# Patient Record
Sex: FEMALE | URBAN - METROPOLITAN AREA
[De-identification: names, ages, dates, MRNs, and addresses within clinical notes are randomized per-mention and may not be internally consistent; named-entity substitution may affect disease eponyms.]

---

## 2021-03-29 ENCOUNTER — APPOINTMENT (RX ONLY)
Dept: URBAN - METROPOLITAN AREA CLINIC 55 | Facility: CLINIC | Age: 56
Setting detail: DERMATOLOGY
End: 2021-03-29

## 2021-03-29 DIAGNOSIS — Z41.9 ENCOUNTER FOR PROCEDURE FOR PURPOSES OTHER THAN REMEDYING HEALTH STATE, UNSPECIFIED: ICD-10-CM

## 2021-03-29 PROCEDURE — ? COSMETIC CONSULTATION: GENERAL

## 2021-03-29 PROCEDURE — ? SUBCUTANEOUS HORMONE PELLET IMPLANTATION

## 2021-03-29 PROCEDURE — ? BOTOX

## 2021-03-29 NOTE — PROCEDURE: SUBCUTANEOUS HORMONE PELLET IMPLANTATION
Anesthesia Volume In Cc: 8
Anesthesia Type: 1% lidocaine with epinephrine and a 1:10 solution of 8.4% sodium bicarbonate
Anesthesia Text (1% Lidocaine With Epinephrine (1.5cc)......): was injected subdermally 2 inches in a horizontal fashion to achieve local anesthesia.
Camacho Lot Number: No
Pre-Operative Text: The patient was placed in a lateral recumbent position. The skin 2-3 inches below the waistline in the upper outer quadrant of the buttocks was cleansed with alcohol. See diagram.
Incision And Trocar Text: After ensuring adequate anesthesia, a 4mm stab incision was made. A 3.5mm trocar was introduced through the incision into the subcutaneous fat coursing horizontally toward the hip. The stylus was then removed.
Testosterone Expiration Date (Optional): 07/22/21, 03/04/21
Number Of Testosterone Pellets: 1
Detail Level: None
Price (Use Numbers Only, No Special Characters Or $): 719
Estradiol Expiration Date (Optional): 10/10/2021
Testosterone Lot Number (Optional): [id number] 87.5mg, [id number]
Pellet Placement Text (Pellets Of Testosterone.....Xxx): and or estradiol were then placed into the trocar and slid into place under the skin, approximately 1.5 inches from the incision without difficulty. The trocar was then withdrawn and the wound was closed with steri-strips and a dressing was applied. The patient applied pressure to the wound for 4 minutes. There was less than 1cc of blood loss during the case. The patient was given a post-op instruction sheet and has been instructed to call us if she experiences any problems.
Estradiol Lot Number (Optional): [id number]
Total Testosterone Mg (Optional): 125
Total Estradiol Mg (Optional): 10
Post-Care Instructions: I reviewed with the patient in detail post-care instructions. Patient understands to call the clinic if there is any redness, swelling or pain. A detailed post-insertion hand was reviewed and provided to the patient.

## 2021-03-29 NOTE — PROCEDURE: BOTOX
Detail Level: Detailed
Additional Area 6 Units: 0
Show Orbicularis Oculi Units: Yes
Price (Use Numbers Only, No Special Characters Or $): 737
Show Right And Left Brow Units: No
Post-Care Instructions: Patient instructed to not lie down for 4 hours post procedure and informed not to manipulate treatment area for 4 hours. \\nRecommended follow up in 7 days if needed.
Additional Area 2 Location: chin
Glabellar Complex Units: 20
Expiration Date (Month Year): 12/2023
Dilution (U/0.1 Cc): 4
Lot #: [id number]
Consent: Written consent obtained. Patient denies pregnancy and breastfeeding. Risks include but not limited to lid/brow ptosis, bruising, swelling, diplopia, temporary effect, incomplete chemical denervation.

## 2021-05-19 ENCOUNTER — APPOINTMENT (RX ONLY)
Dept: URBAN - METROPOLITAN AREA CLINIC 55 | Facility: CLINIC | Age: 56
Setting detail: DERMATOLOGY
End: 2021-05-19

## 2021-05-19 DIAGNOSIS — Z41.9 ENCOUNTER FOR PROCEDURE FOR PURPOSES OTHER THAN REMEDYING HEALTH STATE, UNSPECIFIED: ICD-10-CM

## 2021-05-19 PROCEDURE — ? LAB REPORTS REVIEWED

## 2021-05-19 NOTE — PROCEDURE: LAB REPORTS REVIEWED
Detail Level: Zone
Summarized Lab Results: No change in BioTe treatment plan. Estradiol 79, testosterone 216, FSH 23.1, TSH 1.12

## 2021-10-04 ENCOUNTER — APPOINTMENT (RX ONLY)
Dept: URBAN - METROPOLITAN AREA CLINIC 55 | Facility: CLINIC | Age: 56
Setting detail: DERMATOLOGY
End: 2021-10-04

## 2021-10-04 DIAGNOSIS — Z41.9 ENCOUNTER FOR PROCEDURE FOR PURPOSES OTHER THAN REMEDYING HEALTH STATE, UNSPECIFIED: ICD-10-CM

## 2021-10-04 PROCEDURE — ? SUBCUTANEOUS HORMONE PELLET IMPLANTATION

## 2021-10-04 ASSESSMENT — LOCATION ZONE DERM: LOCATION ZONE: TRUNK

## 2021-10-04 ASSESSMENT — LOCATION SIMPLE DESCRIPTION DERM: LOCATION SIMPLE: LEFT BUTTOCK

## 2021-10-04 ASSESSMENT — LOCATION DETAILED DESCRIPTION DERM: LOCATION DETAILED: LEFT BUTTOCK

## 2021-10-04 NOTE — PROCEDURE: SUBCUTANEOUS HORMONE PELLET IMPLANTATION
Incision And Trocar Text: After ensuring adequate anesthesia, a 4mm stab incision was made. A 3.5mm trocar was introduced through the incision into the subcutaneous fat coursing horizontally toward the hip. The stylus was then removed.
Price (Use Numbers Only, No Special Characters Or $): 960
Pellet Placement Text (Pellets Of Testosterone.....Xxx): and or estradiol were then placed into the trocar and slid into place under the skin, approximately 1.5 inches from the incision without difficulty. The trocar was then withdrawn and the wound was closed with steri-strips and a dressing was applied. The patient applied pressure to the wound for 4 minutes. There was less than 1cc of blood loss during the case. The patient was given a post-op instruction sheet and has been instructed to call us if she experiences any problems.
Camacho Lot Number: No
Number Of Estradiol Pellets: 1
Estradiol Lot Number (Optional): [id number]
Detail Level: None
Number Of Testosterone Pellets: 2
Pre-Operative Text: The patient was placed in a lateral recumbent position. The skin 2-3 inches below the waistline in the upper outer quadrant of the buttocks was cleansed with alcohol. See diagram.
Anesthesia Volume In Cc: 8
Total Testosterone Mg (Optional): 112.5
Post-Care Instructions: I reviewed with the patient in detail post-care instructions. Patient understands to call the clinic if there is any redness, swelling or pain. A detailed post-insertion hand was reviewed and provided to the patient.
Testosterone Expiration Date (Optional): 6/3/2022, 7/3/2022
Testosterone Lot Number (Optional): [id number]- 87.5mg, [id number] 25mg
Total Estradiol Mg (Optional): 10
Estradiol Expiration Date (Optional): 5/19/2022
Anesthesia Text (1% Lidocaine With Epinephrine (1.5cc)......): was injected subdermally 2 inches in a horizontal fashion to achieve local anesthesia.
Anesthesia Type: 1% lidocaine with epinephrine and a 1:10 solution of 8.4% sodium bicarbonate

## 2022-03-15 ENCOUNTER — APPOINTMENT (RX ONLY)
Dept: URBAN - METROPOLITAN AREA CLINIC 55 | Facility: CLINIC | Age: 57
Setting detail: DERMATOLOGY
End: 2022-03-15

## 2022-03-15 DIAGNOSIS — Z41.9 ENCOUNTER FOR PROCEDURE FOR PURPOSES OTHER THAN REMEDYING HEALTH STATE, UNSPECIFIED: ICD-10-CM

## 2022-03-15 PROCEDURE — ? BOTOX

## 2022-03-15 PROCEDURE — ? SUBCUTANEOUS HORMONE PELLET IMPLANTATION

## 2022-03-15 ASSESSMENT — LOCATION DETAILED DESCRIPTION DERM: LOCATION DETAILED: RIGHT BUTTOCK

## 2022-03-15 ASSESSMENT — LOCATION ZONE DERM: LOCATION ZONE: TRUNK

## 2022-03-15 ASSESSMENT — LOCATION SIMPLE DESCRIPTION DERM: LOCATION SIMPLE: RIGHT BUTTOCK

## 2022-03-15 NOTE — PROCEDURE: SUBCUTANEOUS HORMONE PELLET IMPLANTATION
Number Of Estradiol Pellets: 1
Incision And Trocar Text: After ensuring adequate anesthesia, a 4mm stab incision was made. A 3.5mm trocar was introduced through the incision into the subcutaneous fat coursing horizontally toward the hip. The stylus was then removed.
Estradiol Lot Number (Optional): [id number]
Camacho Lot Number: No
Anesthesia Text (1% Lidocaine With Epinephrine (1.5cc)......): was injected subdermally 2 inches in a horizontal fashion to achieve local anesthesia.
Total Estradiol Mg (Optional): 6
Estradiol Expiration Date (Optional): 12/09/22
Anesthesia Volume In Cc: 8
Post-Care Instructions: I reviewed with the patient in detail post-care instructions. Patient understands to call the clinic if there is any redness, swelling or pain. A detailed post-insertion hand was reviewed and provided to the patient.
Pellet Placement Text (Pellets Of Testosterone.....Xxx): and or estradiol were then placed into the trocar and slid into place under the skin, approximately 1.5 inches from the incision without difficulty. The trocar was then withdrawn and the wound was closed with steri-strips and a dressing was applied. The patient applied pressure to the wound for 4 minutes. There was less than 1cc of blood loss during the case. The patient was given a post-op instruction sheet and has been instructed to call us if she experiences any problems.
Pre-Operative Text: The patient was placed in a lateral recumbent position. The skin 2-3 inches below the waistline in the upper outer quadrant of the buttocks was cleansed with alcohol. See diagram.
Detail Level: None
Price (Use Numbers Only, No Special Characters Or $): 696
Anesthesia Type: 1% lidocaine with epinephrine and a 1:10 solution of 8.4% sodium bicarbonate

## 2022-03-15 NOTE — PROCEDURE: BOTOX
Additional Area 2 Location: chin
Glabellar Complex Units: 20
Show Additional Area 4: Yes
Depressor Anguli Oris Units: 0
Show Ucl Units: No
Consent: Written consent obtained. Patient denies pregnancy and breastfeeding. Risks include but not limited to lid/brow ptosis, bruising, swelling, diplopia, temporary effect, incomplete chemical denervation.
Lot #: [id number]
Detail Level: Detailed
Post-Care Instructions: Patient instructed to not lie down for 4 hours post procedure and informed not to manipulate treatment area for 4 hours. \\nRecommended follow up in 7 days if needed.
Expiration Date (Month Year): 11/2024
Additional Area 1 Location: cutaneous upper lip
Dilution (U/0.1 Cc): 4

## 2022-07-26 ENCOUNTER — APPOINTMENT (RX ONLY)
Dept: URBAN - METROPOLITAN AREA CLINIC 55 | Facility: CLINIC | Age: 57
Setting detail: DERMATOLOGY
End: 2022-07-26

## 2022-07-26 DIAGNOSIS — Z41.9 ENCOUNTER FOR PROCEDURE FOR PURPOSES OTHER THAN REMEDYING HEALTH STATE, UNSPECIFIED: ICD-10-CM

## 2022-07-26 PROCEDURE — ? BOTOX

## 2022-07-26 PROCEDURE — ? FILLERS

## 2022-07-26 PROCEDURE — ? SUBCUTANEOUS HORMONE PELLET IMPLANTATION

## 2022-07-26 ASSESSMENT — LOCATION ZONE DERM: LOCATION ZONE: TRUNK

## 2022-07-26 ASSESSMENT — LOCATION DETAILED DESCRIPTION DERM: LOCATION DETAILED: LEFT BUTTOCK

## 2022-07-26 ASSESSMENT — LOCATION SIMPLE DESCRIPTION DERM: LOCATION SIMPLE: LEFT BUTTOCK

## 2022-07-26 NOTE — PROCEDURE: BOTOX
Show [place] Units: Yes
[facility] Units: 0
Show Lcl Units: No
Additional Area 2 Location: chin
Expiration Date (Month Year): 3/31/2024
Post-Care Instructions: Patient instructed to not lie down for 4 hours post procedure and informed not to manipulate treatment area for 4 hours. \\nRecommended follow up in 7 days if needed.
Lot #: [id number]
Consent: Written consent obtained. Patient denies pregnancy and breastfeeding. Risks include but not limited to lid/brow ptosis, bruising, swelling, diplopia, temporary effect, incomplete chemical denervation.
Glabellar Complex Units: 20
Additional Area 1 Location: cutaneous upper lip
Dilution (U/0.1 Cc): 4
Detail Level: Detailed

## 2022-07-26 NOTE — PROCEDURE: FILLERS
Use Map Statement For Sites (Optional): No
[place] Filler Volume In Cc: 0
Post-Care Instructions: Patient instructed to apply ice to reduce swelling. Post care handout given to patient.
Lot #: [id number]
Lot #: [id number]
Expiration Date (Month Year): 07/31/2024
Include Cannula Information In Note?: Yes
Lot #: [id number]
Lot #: [id number]
Filler: Restylane-L
Anesthesia Type: 1% lidocaine with epinephrine
Detail Level: Detailed
Expiration Date (Month Year): 07/31/24
Expiration Date (Month Year): 05/31/23
Consent: Written consent obtained. Risks include but not limited to bruising, beading, irregular texture, ulceration, infection, allergic reaction, scar formation, incomplete augmentation, temporary nature, procedural pain.
Aspiration Statement: Aspiration was performed prior to injecting site with filler.
Filler Comments: .5cc used.
Expiration Date (Month Year): 01/31/23
Anesthesia Volume In Cc: 0.3
Include Cannula Size?: 25G
Additional Anesthesia Volume In Cc: 6
Map Statment: See Attach Map for Complete Details
Expiration Date (Month Year): 9/30/2024

## 2022-07-26 NOTE — PROCEDURE: SUBCUTANEOUS HORMONE PELLET IMPLANTATION
Testosterone Lot Number (Optional): [id number]
Incision And Trocar Text: After ensuring adequate anesthesia, a 4mm stab incision was made. A 3.5mm trocar was introduced through the incision into the subcutaneous fat coursing horizontally toward the hip. The stylus was then removed.
Estradiol Lot Number (Optional): [id number]
Camacho Lot Number: No
Anesthesia Text (1% Lidocaine With Epinephrine (1.5cc)......): was injected subdermally 2 inches in a horizontal fashion to achieve local anesthesia.
Anesthesia Type: 1% lidocaine with epinephrine and a 1:10 solution of 8.4% sodium bicarbonate
Price (Use Numbers Only, No Special Characters Or $): 654
Detail Level: None
Post-Care Instructions: I reviewed with the patient in detail post-care instructions. Patient understands to call the clinic if there is any redness, swelling or pain. A detailed post-insertion hand was reviewed and provided to the patient.
Estradiol Expiration Date (Optional): 6/1/2023
Testosterone Expiration Date (Optional): 12/09/22
Anesthesia Volume In Cc: 8
Total Testosterone Mg (Optional): 100
Pre-Operative Text: The patient was placed in a lateral recumbent position. The skin 2-3 inches below the waistline in the upper outer quadrant of the buttocks was cleansed with alcohol. See diagram.
Number Of Estradiol Pellets: 1
Pellet Placement Text (Pellets Of Testosterone.....Xxx): and or estradiol were then placed into the trocar and slid into place under the skin, approximately 1.5 inches from the incision without difficulty. The trocar was then withdrawn and the wound was closed with steri-strips and a dressing was applied. The patient applied pressure to the wound for 4 minutes. There was less than 1cc of blood loss during the case. The patient was given a post-op instruction sheet and has been instructed to call us if she experiences any problems.
Total Estradiol Mg (Optional): 6

## 2022-10-17 ENCOUNTER — APPOINTMENT (RX ONLY)
Dept: URBAN - METROPOLITAN AREA CLINIC 55 | Facility: CLINIC | Age: 57
Setting detail: DERMATOLOGY
End: 2022-10-17

## 2022-10-17 DIAGNOSIS — Z41.9 ENCOUNTER FOR PROCEDURE FOR PURPOSES OTHER THAN REMEDYING HEALTH STATE, UNSPECIFIED: ICD-10-CM

## 2022-10-17 PROCEDURE — ? LAB REPORTS REVIEWED

## 2022-10-17 NOTE — PROCEDURE: LAB REPORTS REVIEWED
Summarized Lab Results: Reviewed final lab results from 10/07/22. A copy of the final lab results was mailed to Meena's homes address. It was that Meena continue all medications and supplements as directed and that she bring the copy of her lab results to [facility] provider to set up a plan of care.
Detail Level: Zone